# Patient Record
Sex: FEMALE | Race: WHITE
[De-identification: names, ages, dates, MRNs, and addresses within clinical notes are randomized per-mention and may not be internally consistent; named-entity substitution may affect disease eponyms.]

---

## 2018-05-02 ENCOUNTER — HOSPITAL ENCOUNTER (INPATIENT)
Dept: HOSPITAL 92 - ERS | Age: 73
LOS: 14 days | Discharge: HOME HEALTH | DRG: 853 | End: 2018-05-16
Attending: INTERNAL MEDICINE | Admitting: INTERNAL MEDICINE
Payer: MEDICARE

## 2018-05-02 ENCOUNTER — HOSPITAL ENCOUNTER (EMERGENCY)
Dept: HOSPITAL 9 - MADERS | Age: 73
End: 2018-05-02
Payer: MEDICARE

## 2018-05-02 VITALS — BODY MASS INDEX: 38.4 KG/M2

## 2018-05-02 DIAGNOSIS — L03.114: ICD-10-CM

## 2018-05-02 DIAGNOSIS — N17.9: ICD-10-CM

## 2018-05-02 DIAGNOSIS — M72.6: ICD-10-CM

## 2018-05-02 DIAGNOSIS — I10: ICD-10-CM

## 2018-05-02 DIAGNOSIS — Z88.0: ICD-10-CM

## 2018-05-02 DIAGNOSIS — E87.3: ICD-10-CM

## 2018-05-02 DIAGNOSIS — R65.21: ICD-10-CM

## 2018-05-02 DIAGNOSIS — A04.72: ICD-10-CM

## 2018-05-02 DIAGNOSIS — I42.9: ICD-10-CM

## 2018-05-02 DIAGNOSIS — E83.42: ICD-10-CM

## 2018-05-02 DIAGNOSIS — J96.01: ICD-10-CM

## 2018-05-02 DIAGNOSIS — E78.5: ICD-10-CM

## 2018-05-02 DIAGNOSIS — D70.9: ICD-10-CM

## 2018-05-02 DIAGNOSIS — I88.9: ICD-10-CM

## 2018-05-02 DIAGNOSIS — D50.0: ICD-10-CM

## 2018-05-02 DIAGNOSIS — Z79.899: ICD-10-CM

## 2018-05-02 DIAGNOSIS — A40.9: Primary | ICD-10-CM

## 2018-05-02 DIAGNOSIS — L02.512: ICD-10-CM

## 2018-05-02 DIAGNOSIS — Z85.3: ICD-10-CM

## 2018-05-02 DIAGNOSIS — E87.2: ICD-10-CM

## 2018-05-02 DIAGNOSIS — I11.0: ICD-10-CM

## 2018-05-02 DIAGNOSIS — A41.9: Primary | ICD-10-CM

## 2018-05-02 DIAGNOSIS — I48.0: ICD-10-CM

## 2018-05-02 DIAGNOSIS — Z93.1: ICD-10-CM

## 2018-05-02 DIAGNOSIS — E66.9: ICD-10-CM

## 2018-05-02 DIAGNOSIS — D69.6: ICD-10-CM

## 2018-05-02 DIAGNOSIS — E87.6: ICD-10-CM

## 2018-05-02 DIAGNOSIS — I47.1: ICD-10-CM

## 2018-05-02 DIAGNOSIS — Z79.01: ICD-10-CM

## 2018-05-02 DIAGNOSIS — Z88.5: ICD-10-CM

## 2018-05-02 DIAGNOSIS — I50.21: ICD-10-CM

## 2018-05-02 LAB
ALBUMIN SERPL BCG-MCNC: 3.1 G/DL (ref 3.4–4.8)
ALBUMIN SERPL BCG-MCNC: 3.4 G/DL (ref 3.4–4.8)
ALP SERPL-CCNC: 34 U/L (ref 40–150)
ALP SERPL-CCNC: 41 U/L (ref 40–150)
ALT SERPL W P-5'-P-CCNC: 38 U/L (ref 8–55)
ALT SERPL W P-5'-P-CCNC: 44 U/L (ref 8–55)
ANALYZER IN CARDIO: (no result)
ANION GAP SERPL CALC-SCNC: 19 MMOL/L (ref 10–20)
ANION GAP SERPL CALC-SCNC: 24 MMOL/L (ref 10–20)
APTT PPP: 32.5 SEC (ref 22.9–36.1)
APTT PPP: 33.4 SEC (ref 22.9–36.1)
AST SERPL-CCNC: 48 U/L (ref 5–34)
AST SERPL-CCNC: 49 U/L (ref 5–34)
BACTERIA UR QL AUTO: (no result) HPF
BASE EXCESS STD BLDA CALC-SCNC: -10.7 MEQ/L
BILIRUB SERPL-MCNC: 0.7 MG/DL (ref 0.2–1.2)
BILIRUB SERPL-MCNC: 0.8 MG/DL (ref 0.2–1.2)
BUN SERPL-MCNC: 28 MG/DL (ref 9.8–20.1)
BUN SERPL-MCNC: 29 MG/DL (ref 9.8–20.1)
CA-I BLDA-SCNC: 1 MMOL/L (ref 1.12–1.3)
CALCIUM SERPL-MCNC: 7 MG/DL (ref 7.8–10.44)
CALCIUM SERPL-MCNC: 8.1 MG/DL (ref 7.8–10.44)
CASTS #/AREA URNS LPF: (no result) LPF
CHLORIDE SERPL-SCNC: 109 MMOL/L (ref 98–107)
CHLORIDE SERPL-SCNC: 113 MMOL/L (ref 98–107)
CK MB SERPL-MCNC: 3.2 NG/ML (ref 0–6.6)
CK SERPL-CCNC: 299 U/L (ref 29–168)
CO2 SERPL-SCNC: 15 MMOL/L (ref 23–31)
CO2 SERPL-SCNC: 16 MMOL/L (ref 23–31)
CREAT CL PREDICTED SERPL C-G-VRATE: 0 ML/MIN (ref 70–130)
CREAT CL PREDICTED SERPL C-G-VRATE: 0 ML/MIN (ref 70–130)
CRYSTAL-AUWI FLAG: 0.1 (ref 0–15)
D DIMER PPP FEU-MCNC: 3.69 *MCG/ML (ref 0.27–0.43)
D DIMER PPP FEU-MCNC: 4.2 *MCG/ML (ref 0.27–0.43)
FIBRINOGEN PPP-MCNC: 310 MG/DL (ref 253–463)
FSP-QUALITATIVE: (no result)
FSP-QUALITATIVE: (no result)
FSP-SEMIQUANTITATIVE: (no result) MCG/ML (ref ?–5)
FSP-SEMIQUANTITATIVE: (no result) MCG/ML (ref ?–5)
GLOBULIN SER CALC-MCNC: 1.8 G/DL (ref 2.4–3.5)
GLOBULIN SER CALC-MCNC: 2.2 G/DL (ref 2.4–3.5)
GLUCOSE SERPL-MCNC: 121 MG/DL (ref 83–110)
GLUCOSE SERPL-MCNC: 122 MG/DL (ref 83–110)
HCO3 BLDA-SCNC: 13.7 MEQ/L (ref 22–26)
HCT VFR BLDA CALC: 36.8 % (ref 36–47)
HEV IGM SER QL: 47.3 (ref 0–7.99)
HGB BLD-MCNC: 12 G/DL (ref 12–16)
HGB BLD-MCNC: 12.7 G/DL (ref 12–16)
HGB BLD-MCNC: 13.6 G/DL (ref 12–16)
HGB BLDA-MCNC: 12.1 G/DL (ref 12–16)
HYALINE CASTS #/AREA URNS LPF: (no result) LPF
INR PPP: 1.6
INR PPP: 1.6
LIPASE SERPL-CCNC: 26 U/L (ref 8–78)
MCH RBC QN AUTO: 31.8 PG (ref 27–31)
MCH RBC QN AUTO: 33.5 PG (ref 27–31)
MCV RBC AUTO: 100 FL (ref 81–99)
MCV RBC AUTO: 95.6 FL (ref 81–99)
MDIFF COMPLETE?: YES
MDIFF COMPLETE?: YES
PATHC CAST-AUWI FLAG: 8.14 (ref 0–2.49)
PCO2 BLDA: 26.4 MMHG (ref 35–45)
PH BLDA: 7.33 [PH] (ref 7.35–7.45)
PLATELET # BLD AUTO: 150 THOU/UL (ref 130–400)
PLATELET # BLD AUTO: 166 THOU/UL (ref 130–400)
PLATELET # BLD AUTO: 179 THOU/UL (ref 130–400)
PLATELET BLD QL SMEAR: (no result)
PLATELET BLD QL SMEAR: (no result)
PO2 BLDA: 91.5 MMHG (ref 80–100)
POTASSIUM SERPL-SCNC: 3 MMOL/L (ref 3.5–5.1)
POTASSIUM SERPL-SCNC: 3.1 MMOL/L (ref 3.5–5.1)
PROTHROMBIN TIME: 19.1 SEC (ref 12–14.7)
PROTHROMBIN TIME: 19.4 SEC (ref 12–14.7)
RBC # BLD AUTO: 3.8 MILL/UL (ref 4.2–5.4)
RBC # BLD AUTO: 4.29 MILL/UL (ref 4.2–5.4)
RBC UR QL AUTO: (no result) HPF (ref 0–3)
REFLEX FOR REVIEW??: YES
SODIUM SERPL-SCNC: 144 MMOL/L (ref 136–145)
SODIUM SERPL-SCNC: 146 MMOL/L (ref 136–145)
SP GR UR STRIP: 1.03 (ref 1–1.04)
SPECIMEN DRAWN FROM PATIENT: (no result)
SPERM-AUWI FLAG: 0 (ref 0–9.9)
TROPONIN I SERPL DL<=0.01 NG/ML-MCNC: (no result) NG/ML (ref ?–0.03)
WBC # BLD AUTO: 2.2 THOU/UL (ref 4.8–10.8)
WBC # BLD AUTO: 4.3 THOU/UL (ref 4.8–10.8)
WBC UR QL AUTO: (no result) HPF (ref 0–3)
YEAST-AUWI FLAG: 0 (ref 0–25)

## 2018-05-02 PROCEDURE — 0LB40ZZ EXCISION OF LEFT UPPER ARM TENDON, OPEN APPROACH: ICD-10-PCS | Performed by: ORTHOPAEDIC SURGERY

## 2018-05-02 PROCEDURE — 83735 ASSAY OF MAGNESIUM: CPT

## 2018-05-02 PROCEDURE — 80053 COMPREHEN METABOLIC PANEL: CPT

## 2018-05-02 PROCEDURE — 0J9F0ZZ DRAINAGE OF LEFT UPPER ARM SUBCUTANEOUS TISSUE AND FASCIA, OPEN APPROACH: ICD-10-PCS | Performed by: ORTHOPAEDIC SURGERY

## 2018-05-02 PROCEDURE — 96365 THER/PROPH/DIAG IV INF INIT: CPT

## 2018-05-02 PROCEDURE — 87070 CULTURE OTHR SPECIMN AEROBIC: CPT

## 2018-05-02 PROCEDURE — 84484 ASSAY OF TROPONIN QUANT: CPT

## 2018-05-02 PROCEDURE — 86900 BLOOD TYPING SEROLOGIC ABO: CPT

## 2018-05-02 PROCEDURE — 94660 CPAP INITIATION&MGMT: CPT

## 2018-05-02 PROCEDURE — 96368 THER/DIAG CONCURRENT INF: CPT

## 2018-05-02 PROCEDURE — 87493 C DIFF AMPLIFIED PROBE: CPT

## 2018-05-02 PROCEDURE — 85060 BLOOD SMEAR INTERPRETATION: CPT

## 2018-05-02 PROCEDURE — 87077 CULTURE AEROBIC IDENTIFY: CPT

## 2018-05-02 PROCEDURE — 36415 COLL VENOUS BLD VENIPUNCTURE: CPT

## 2018-05-02 PROCEDURE — 96361 HYDRATE IV INFUSION ADD-ON: CPT

## 2018-05-02 PROCEDURE — 87206 SMEAR FLUORESCENT/ACID STAI: CPT

## 2018-05-02 PROCEDURE — 83690 ASSAY OF LIPASE: CPT

## 2018-05-02 PROCEDURE — 3E033XZ INTRODUCTION OF VASOPRESSOR INTO PERIPHERAL VEIN, PERCUTANEOUS APPROACH: ICD-10-PCS | Performed by: ORTHOPAEDIC SURGERY

## 2018-05-02 PROCEDURE — 88305 TISSUE EXAM BY PATHOLOGIST: CPT

## 2018-05-02 PROCEDURE — 0BH17EZ INSERTION OF ENDOTRACHEAL AIRWAY INTO TRACHEA, VIA NATURAL OR ARTIFICIAL OPENING: ICD-10-PCS | Performed by: ORTHOPAEDIC SURGERY

## 2018-05-02 PROCEDURE — 96374 THER/PROPH/DIAG INJ IV PUSH: CPT

## 2018-05-02 PROCEDURE — 87449 NOS EACH ORGANISM AG IA: CPT

## 2018-05-02 PROCEDURE — 87040 BLOOD CULTURE FOR BACTERIA: CPT

## 2018-05-02 PROCEDURE — C9113 INJ PANTOPRAZOLE SODIUM, VIA: HCPCS

## 2018-05-02 PROCEDURE — 94002 VENT MGMT INPAT INIT DAY: CPT

## 2018-05-02 PROCEDURE — 51702 INSERT TEMP BLADDER CATH: CPT

## 2018-05-02 PROCEDURE — C1758 CATHETER, URETERAL: HCPCS

## 2018-05-02 PROCEDURE — 71045 X-RAY EXAM CHEST 1 VIEW: CPT

## 2018-05-02 PROCEDURE — 85300 ANTITHROMBIN III ACTIVITY: CPT

## 2018-05-02 PROCEDURE — 87324 CLOSTRIDIUM AG IA: CPT

## 2018-05-02 PROCEDURE — 85384 FIBRINOGEN ACTIVITY: CPT

## 2018-05-02 PROCEDURE — 96366 THER/PROPH/DIAG IV INF ADDON: CPT

## 2018-05-02 PROCEDURE — 93010 ELECTROCARDIOGRAM REPORT: CPT

## 2018-05-02 PROCEDURE — 83880 ASSAY OF NATRIURETIC PEPTIDE: CPT

## 2018-05-02 PROCEDURE — C9363 INTEGRA MESHED BIL WOUND MAT: HCPCS

## 2018-05-02 PROCEDURE — 85379 FIBRIN DEGRADATION QUANT: CPT

## 2018-05-02 PROCEDURE — 88331 PATH CONSLTJ SURG 1 BLK 1SPC: CPT

## 2018-05-02 PROCEDURE — 87186 SC STD MICRODIL/AGAR DIL: CPT

## 2018-05-02 PROCEDURE — A4216 STERILE WATER/SALINE, 10 ML: HCPCS

## 2018-05-02 PROCEDURE — 96375 TX/PRO/DX INJ NEW DRUG ADDON: CPT

## 2018-05-02 PROCEDURE — 93005 ELECTROCARDIOGRAM TRACING: CPT

## 2018-05-02 PROCEDURE — 81003 URINALYSIS AUTO W/O SCOPE: CPT

## 2018-05-02 PROCEDURE — 85025 COMPLETE CBC W/AUTO DIFF WBC: CPT

## 2018-05-02 PROCEDURE — 87205 SMEAR GRAM STAIN: CPT

## 2018-05-02 PROCEDURE — 80202 ASSAY OF VANCOMYCIN: CPT

## 2018-05-02 PROCEDURE — 36556 INSERT NON-TUNNEL CV CATH: CPT

## 2018-05-02 PROCEDURE — 81015 MICROSCOPIC EXAM OF URINE: CPT

## 2018-05-02 PROCEDURE — 82553 CREATINE MB FRACTION: CPT

## 2018-05-02 PROCEDURE — P9047 ALBUMIN (HUMAN), 25%, 50ML: HCPCS

## 2018-05-02 PROCEDURE — 90715 TDAP VACCINE 7 YRS/> IM: CPT

## 2018-05-02 PROCEDURE — 94003 VENT MGMT INPAT SUBQ DAY: CPT

## 2018-05-02 PROCEDURE — 87102 FUNGUS ISOLATION CULTURE: CPT

## 2018-05-02 PROCEDURE — 85362 FIBRIN DEGRADATION PRODUCTS: CPT

## 2018-05-02 PROCEDURE — 96376 TX/PRO/DX INJ SAME DRUG ADON: CPT

## 2018-05-02 PROCEDURE — 93306 TTE W/DOPPLER COMPLETE: CPT

## 2018-05-02 PROCEDURE — 85610 PROTHROMBIN TIME: CPT

## 2018-05-02 PROCEDURE — 86850 RBC ANTIBODY SCREEN: CPT

## 2018-05-02 PROCEDURE — 85049 AUTOMATED PLATELET COUNT: CPT

## 2018-05-02 PROCEDURE — 74018 RADEX ABDOMEN 1 VIEW: CPT

## 2018-05-02 PROCEDURE — 36416 COLLJ CAPILLARY BLOOD SPEC: CPT

## 2018-05-02 PROCEDURE — 84100 ASSAY OF PHOSPHORUS: CPT

## 2018-05-02 PROCEDURE — 86901 BLOOD TYPING SEROLOGIC RH(D): CPT

## 2018-05-02 PROCEDURE — 5A1955Z RESPIRATORY VENTILATION, GREATER THAN 96 CONSECUTIVE HOURS: ICD-10-PCS | Performed by: ORTHOPAEDIC SURGERY

## 2018-05-02 PROCEDURE — 82805 BLOOD GASES W/O2 SATURATION: CPT

## 2018-05-02 PROCEDURE — 85730 THROMBOPLASTIN TIME PARTIAL: CPT

## 2018-05-02 PROCEDURE — 90471 IMMUNIZATION ADMIN: CPT

## 2018-05-02 PROCEDURE — 96367 TX/PROPH/DG ADDL SEQ IV INF: CPT

## 2018-05-02 PROCEDURE — 94640 AIRWAY INHALATION TREATMENT: CPT

## 2018-05-02 PROCEDURE — 88304 TISSUE EXAM BY PATHOLOGIST: CPT

## 2018-05-02 PROCEDURE — 83605 ASSAY OF LACTIC ACID: CPT

## 2018-05-02 PROCEDURE — S0020 INJECTION, BUPIVICAINE HYDRO: HCPCS

## 2018-05-02 RX ADMIN — NOREPINEPHRINE BITARTRATE SCH MLS: 1 INJECTION INTRAVENOUS at 22:27

## 2018-05-02 RX ADMIN — Medication SCH ML: at 20:50

## 2018-05-02 RX ADMIN — Medication SCH ML: at 23:35

## 2018-05-02 RX ADMIN — FENTANYL CITRATE SCH MLS: 50 INJECTION, SOLUTION INTRAMUSCULAR; INTRAVENOUS at 16:20

## 2018-05-02 RX ADMIN — CLINDAMYCIN PHOSPHATE SCH MLS: 900 INJECTION, SOLUTION INTRAVENOUS at 18:38

## 2018-05-02 RX ADMIN — CLINDAMYCIN PHOSPHATE SCH MLS: 900 INJECTION, SOLUTION INTRAVENOUS at 23:36

## 2018-05-02 RX ADMIN — VASOPRESSIN SCH MLS: 20 INJECTION INTRAVENOUS at 19:39

## 2018-05-02 RX ADMIN — CEFEPIME HYDROCHLORIDE SCH MLS: 1 INJECTION, POWDER, FOR SOLUTION INTRAMUSCULAR; INTRAVENOUS at 20:49

## 2018-05-02 NOTE — RAD
LEFT HAND 3 VIEWS:

 

HISTORY: 

Injury, left hand pain, cut left hand.

 

FINDINGS/IMPRESSION:  

No acute fracture or dislocation is identified.  No radiopaque foreign body is seen.  Degenerative ch
anges are present, most prominent in the 1st carpometacarpal joint.

 

POS: [facility]

## 2018-05-03 LAB
ALBUMIN SERPL BCG-MCNC: 2.7 G/DL (ref 3.4–4.8)
ALP SERPL-CCNC: 17 U/L (ref 40–150)
ALT SERPL W P-5'-P-CCNC: 42 U/L (ref 8–55)
ANALYZER IN CARDIO: (no result)
ANION GAP SERPL CALC-SCNC: 23 MMOL/L (ref 10–20)
AST SERPL-CCNC: 62 U/L (ref 5–34)
BASE EXCESS STD BLDA CALC-SCNC: -15.8 MEQ/L
BILIRUB SERPL-MCNC: 0.8 MG/DL (ref 0.2–1.2)
BUN SERPL-MCNC: 36 MG/DL (ref 9.8–20.1)
CA-I BLDA-SCNC: 1 MMOL/L (ref 1.12–1.3)
CALCIUM SERPL-MCNC: 7.1 MG/DL (ref 7.8–10.44)
CHLORIDE SERPL-SCNC: 110 MMOL/L (ref 98–107)
CO2 SERPL-SCNC: 12 MMOL/L (ref 23–31)
CREAT CL PREDICTED SERPL C-G-VRATE: 41 ML/MIN (ref 70–130)
GLOBULIN SER CALC-MCNC: 2.2 G/DL (ref 2.4–3.5)
GLUCOSE SERPL-MCNC: 125 MG/DL (ref 83–110)
HCO3 BLDA-SCNC: 11 MEQ/L (ref 22–26)
HCT VFR BLDA CALC: 36.1 % (ref 36–47)
HGB BLD-MCNC: 11.2 G/DL (ref 12–16)
HGB BLD-MCNC: 12.3 G/DL (ref 12–16)
HGB BLDA-MCNC: 11.9 G/DL (ref 12–16)
MACROCYTES BLD QL SMEAR: (no result) (100X)
MAGNESIUM SERPL-MCNC: 1.2 MG/DL (ref 1.6–2.6)
MCH RBC QN AUTO: 33.4 PG (ref 27–31)
MCH RBC QN AUTO: 34.4 PG (ref 27–31)
MCV RBC AUTO: 101 FL (ref 81–99)
MCV RBC AUTO: 102 FL (ref 81–99)
MDIFF COMPLETE?: YES
MDIFF COMPLETE?: YES
O2 A-A PPRESDIFF RESPIRATORY: 135.5 MM[HG] (ref 0–20)
PCO2 BLDA: 29.6 MMHG (ref 35–45)
PH BLDA: 7.19 [PH] (ref 7.35–7.45)
PLATELET # BLD AUTO: 128 THOU/UL (ref 130–400)
PLATELET # BLD AUTO: 90 THOU/UL (ref 130–400)
PLATELET BLD QL SMEAR: (no result)
PLATELET BLD QL SMEAR: (no result)
PO2 BLDA: 112.7 MMHG (ref 80–100)
POTASSIUM SERPL-SCNC: 4.2 MMOL/L (ref 3.5–5.1)
RBC # BLD AUTO: 3.36 MILL/UL (ref 4.2–5.4)
RBC # BLD AUTO: 3.59 MILL/UL (ref 4.2–5.4)
SODIUM SERPL-SCNC: 141 MMOL/L (ref 136–145)
SPECIMEN DRAWN FROM PATIENT: (no result)
WBC # BLD AUTO: 8.8 THOU/UL (ref 4.8–10.8)
WBC # BLD AUTO: 9.1 THOU/UL (ref 4.8–10.8)

## 2018-05-03 PROCEDURE — 0JBH0ZZ EXCISION OF LEFT LOWER ARM SUBCUTANEOUS TISSUE AND FASCIA, OPEN APPROACH: ICD-10-PCS | Performed by: ORTHOPAEDIC SURGERY

## 2018-05-03 RX ADMIN — CLINDAMYCIN PHOSPHATE SCH MLS: 900 INJECTION, SOLUTION INTRAVENOUS at 18:05

## 2018-05-03 RX ADMIN — CEFEPIME HYDROCHLORIDE SCH MLS: 1 INJECTION, POWDER, FOR SOLUTION INTRAMUSCULAR; INTRAVENOUS at 04:00

## 2018-05-03 RX ADMIN — SODIUM BICARBONATE SCH MLS: 84 INJECTION, SOLUTION INTRAVENOUS at 09:24

## 2018-05-03 RX ADMIN — FENTANYL CITRATE SCH MLS: 50 INJECTION, SOLUTION INTRAMUSCULAR; INTRAVENOUS at 03:17

## 2018-05-03 RX ADMIN — VASOPRESSIN SCH MLS: 20 INJECTION INTRAVENOUS at 20:09

## 2018-05-03 RX ADMIN — Medication SCH: at 09:26

## 2018-05-03 RX ADMIN — Medication SCH GM: at 18:05

## 2018-05-03 RX ADMIN — Medication SCH ML: at 05:45

## 2018-05-03 RX ADMIN — CLINDAMYCIN PHOSPHATE SCH MLS: 900 INJECTION, SOLUTION INTRAVENOUS at 11:35

## 2018-05-03 RX ADMIN — CLINDAMYCIN PHOSPHATE SCH MLS: 900 INJECTION, SOLUTION INTRAVENOUS at 05:45

## 2018-05-03 RX ADMIN — Medication SCH: at 11:48

## 2018-05-03 RX ADMIN — CEFEPIME HYDROCHLORIDE SCH MLS: 1 INJECTION, POWDER, FOR SOLUTION INTRAMUSCULAR; INTRAVENOUS at 11:35

## 2018-05-03 RX ADMIN — Medication SCH ML: at 20:09

## 2018-05-03 RX ADMIN — Medication PRN ML: at 09:26

## 2018-05-03 RX ADMIN — Medication SCH ML: at 18:09

## 2018-05-03 RX ADMIN — NOREPINEPHRINE BITARTRATE SCH MLS: 1 INJECTION INTRAVENOUS at 06:01

## 2018-05-04 LAB
ALBUMIN SERPL BCG-MCNC: 2.2 G/DL (ref 3.4–4.8)
ALP SERPL-CCNC: 25 U/L (ref 40–150)
ALT SERPL W P-5'-P-CCNC: 46 U/L (ref 8–55)
ANALYZER IN CARDIO: (no result)
ANION GAP SERPL CALC-SCNC: 20 MMOL/L (ref 10–20)
AST SERPL-CCNC: 108 U/L (ref 5–34)
BASE EXCESS STD BLDA CALC-SCNC: -8.3 MEQ/L
BILIRUB SERPL-MCNC: 1.1 MG/DL (ref 0.2–1.2)
BUN SERPL-MCNC: 52 MG/DL (ref 9.8–20.1)
CA-I BLDA-SCNC: 1 MMOL/L (ref 1.12–1.3)
CALCIUM SERPL-MCNC: 6.6 MG/DL (ref 7.8–10.44)
CHLORIDE SERPL-SCNC: 110 MMOL/L (ref 98–107)
CO2 SERPL-SCNC: 16 MMOL/L (ref 23–31)
CREAT CL PREDICTED SERPL C-G-VRATE: 40 ML/MIN (ref 70–130)
D DIMER PPP FEU-MCNC: 4.88 *MCG/ML (ref 0.27–0.43)
FSP-QUALITATIVE: (no result)
FSP-SEMIQUANTITATIVE: (no result) MCG/ML (ref ?–5)
GLOBULIN SER CALC-MCNC: 1.8 G/DL (ref 2.4–3.5)
GLUCOSE SERPL-MCNC: 126 MG/DL (ref 83–110)
HCO3 BLDA-SCNC: 14.7 MEQ/L (ref 22–26)
HCT VFR BLDA CALC: 27.3 % (ref 36–47)
HGB BLD-MCNC: 10.4 G/DL (ref 12–16)
HGB BLDA-MCNC: 9.9 G/DL (ref 12–16)
MACROCYTES BLD QL SMEAR: (no result) (100X)
MAGNESIUM SERPL-MCNC: 1.7 MG/DL (ref 1.6–2.6)
MCH RBC QN AUTO: 33.5 PG (ref 27–31)
MCV RBC AUTO: 100 FL (ref 81–99)
MDIFF COMPLETE?: YES
PCO2 BLDA: 23 MMHG (ref 35–45)
PH BLDA: 7.42 [PH] (ref 7.35–7.45)
PLATELET # BLD AUTO: 78 THOU/UL (ref 130–400)
PLATELET BLD QL SMEAR: (no result)
PO2 BLDA: 103.8 MMHG (ref 80–100)
POTASSIUM SERPL-SCNC: 3.5 MMOL/L (ref 3.5–5.1)
RBC # BLD AUTO: 3.1 MILL/UL (ref 4.2–5.4)
SODIUM SERPL-SCNC: 142 MMOL/L (ref 136–145)
SPECIMEN DRAWN FROM PATIENT: (no result)
VANCOMYCIN TROUGH SERPL-MCNC: 5.5 UG/ML
WBC # BLD AUTO: 8.2 THOU/UL (ref 4.8–10.8)

## 2018-05-04 RX ADMIN — Medication SCH: at 06:15

## 2018-05-04 RX ADMIN — CLINDAMYCIN PHOSPHATE SCH MLS: 900 INJECTION, SOLUTION INTRAVENOUS at 00:51

## 2018-05-04 RX ADMIN — INSULIN HUMAN PRN UNIT: 100 INJECTION, SOLUTION PARENTERAL at 18:45

## 2018-05-04 RX ADMIN — SODIUM BICARBONATE SCH MLS: 84 INJECTION, SOLUTION INTRAVENOUS at 05:55

## 2018-05-04 RX ADMIN — Medication SCH: at 03:55

## 2018-05-04 RX ADMIN — HYDROCORTISONE SODIUM SUCCINATE SCH MG: 100 INJECTION, POWDER, FOR SOLUTION INTRAMUSCULAR; INTRAVENOUS at 13:33

## 2018-05-04 RX ADMIN — Medication SCH ML: at 10:25

## 2018-05-04 RX ADMIN — CLINDAMYCIN PHOSPHATE SCH MLS: 900 INJECTION, SOLUTION INTRAVENOUS at 13:33

## 2018-05-04 RX ADMIN — Medication SCH GM: at 05:49

## 2018-05-04 RX ADMIN — Medication SCH: at 18:06

## 2018-05-04 RX ADMIN — Medication SCH GM: at 18:37

## 2018-05-04 RX ADMIN — HYDROCORTISONE SODIUM SUCCINATE SCH MG: 100 INJECTION, POWDER, FOR SOLUTION INTRAMUSCULAR; INTRAVENOUS at 18:12

## 2018-05-04 RX ADMIN — INSULIN HUMAN PRN UNIT: 100 INJECTION, SOLUTION PARENTERAL at 20:58

## 2018-05-04 RX ADMIN — VASOPRESSIN SCH MLS: 20 INJECTION INTRAVENOUS at 13:55

## 2018-05-04 RX ADMIN — Medication SCH ML: at 20:26

## 2018-05-04 RX ADMIN — CLINDAMYCIN PHOSPHATE SCH MLS: 900 INJECTION, SOLUTION INTRAVENOUS at 18:12

## 2018-05-04 RX ADMIN — AMIODARONE HYDROCHLORIDE SCH MLS: 50 INJECTION, SOLUTION INTRAVENOUS at 18:37

## 2018-05-04 RX ADMIN — FENTANYL CITRATE SCH MLS: 50 INJECTION, SOLUTION INTRAMUSCULAR; INTRAVENOUS at 12:39

## 2018-05-04 RX ADMIN — CLINDAMYCIN PHOSPHATE SCH MLS: 900 INJECTION, SOLUTION INTRAVENOUS at 05:49

## 2018-05-04 RX ADMIN — SODIUM BICARBONATE SCH: 84 INJECTION, SOLUTION INTRAVENOUS at 03:54

## 2018-05-04 RX ADMIN — POTASSIUM CHLORIDE PRN MLS: 29.8 INJECTION, SOLUTION INTRAVENOUS at 06:38

## 2018-05-04 RX ADMIN — SODIUM BICARBONATE SCH MLS: 84 INJECTION, SOLUTION INTRAVENOUS at 20:26

## 2018-05-04 RX ADMIN — INSULIN HUMAN PRN UNIT: 100 INJECTION, SOLUTION PARENTERAL at 13:47

## 2018-05-04 RX ADMIN — NOREPINEPHRINE BITARTRATE SCH MLS: 1 INJECTION INTRAVENOUS at 09:28

## 2018-05-05 LAB
ALBUMIN SERPL BCG-MCNC: 2.1 G/DL (ref 3.4–4.8)
ALP SERPL-CCNC: 48 U/L (ref 40–150)
ALT SERPL W P-5'-P-CCNC: 43 U/L (ref 8–55)
ANALYZER IN CARDIO: (no result)
ANALYZER IN CARDIO: (no result)
ANION GAP SERPL CALC-SCNC: 12 MMOL/L (ref 10–20)
AST SERPL-CCNC: 105 U/L (ref 5–34)
BASE EXCESS STD BLDA CALC-SCNC: -0.3 MEQ/L
BASE EXCESS STD BLDA CALC-SCNC: -0.5 MEQ/L
BILIRUB SERPL-MCNC: 1.7 MG/DL (ref 0.2–1.2)
BUN SERPL-MCNC: 47 MG/DL (ref 9.8–20.1)
CA-I BLDA-SCNC: 1 MMOL/L (ref 1.12–1.3)
CA-I BLDA-SCNC: 1.1 MMOL/L (ref 1.12–1.3)
CALCIUM SERPL-MCNC: 6.9 MG/DL (ref 7.8–10.44)
CHLORIDE SERPL-SCNC: 107 MMOL/L (ref 98–107)
CO2 SERPL-SCNC: 25 MMOL/L (ref 23–31)
CREAT CL PREDICTED SERPL C-G-VRATE: 77 ML/MIN (ref 70–130)
GLOBULIN SER CALC-MCNC: 2.1 G/DL (ref 2.4–3.5)
GLUCOSE SERPL-MCNC: 205 MG/DL (ref 83–110)
HCO3 BLDA-SCNC: 20.8 MEQ/L (ref 22–26)
HCO3 BLDA-SCNC: 24.1 MEQ/L (ref 22–26)
HCT VFR BLDA CALC: 26.9 % (ref 36–47)
HCT VFR BLDA CALC: 42.6 % (ref 36–47)
HGB BLD-MCNC: 9.9 G/DL (ref 12–16)
HGB BLDA-MCNC: 10.9 G/DL (ref 12–16)
HGB BLDA-MCNC: 9.7 G/DL (ref 12–16)
MAGNESIUM SERPL-MCNC: 2.4 MG/DL (ref 1.6–2.6)
MCH RBC QN AUTO: 33.4 PG (ref 27–31)
MCV RBC AUTO: 97 FL (ref 81–99)
MDIFF COMPLETE?: YES
O2 A-A PPRESDIFF RESPIRATORY: 77.27 MM[HG] (ref 0–20)
PCO2 BLDA: 23.8 MMHG (ref 35–45)
PCO2 BLDA: 38.7 MMHG (ref 35–45)
PH BLDA: 7.41 [PH] (ref 7.35–7.45)
PH BLDA: 7.56 [PH] (ref 7.35–7.45)
PLATELET # BLD AUTO: 69 THOU/UL (ref 130–400)
PLATELET BLD QL SMEAR: (no result)
PO2 BLDA: 134 MMHG (ref 80–100)
PO2 BLDA: 74 MMHG (ref 80–100)
POTASSIUM SERPL-SCNC: 2.7 MMOL/L (ref 3.5–5.1)
POTASSIUM SERPL-SCNC: 2.8 MMOL/L (ref 3.5–5.1)
RBC # BLD AUTO: 2.96 MILL/UL (ref 4.2–5.4)
SODIUM SERPL-SCNC: 141 MMOL/L (ref 136–145)
SPECIMEN DRAWN FROM PATIENT: (no result)
SPECIMEN DRAWN FROM PATIENT: (no result)
WBC # BLD AUTO: 11 THOU/UL (ref 4.8–10.8)

## 2018-05-05 PROCEDURE — 0LB60ZZ EXCISION OF LEFT LOWER ARM AND WRIST TENDON, OPEN APPROACH: ICD-10-PCS | Performed by: ORTHOPAEDIC SURGERY

## 2018-05-05 RX ADMIN — POTASSIUM CHLORIDE PRN MLS: 29.8 INJECTION, SOLUTION INTRAVENOUS at 06:02

## 2018-05-05 RX ADMIN — INSULIN HUMAN PRN UNIT: 100 INJECTION, SOLUTION PARENTERAL at 19:38

## 2018-05-05 RX ADMIN — INSULIN HUMAN PRN UNIT: 100 INJECTION, SOLUTION PARENTERAL at 05:31

## 2018-05-05 RX ADMIN — Medication SCH GM: at 05:21

## 2018-05-05 RX ADMIN — Medication SCH: at 19:48

## 2018-05-05 RX ADMIN — FENTANYL CITRATE SCH MLS: 50 INJECTION, SOLUTION INTRAMUSCULAR; INTRAVENOUS at 01:20

## 2018-05-05 RX ADMIN — FENTANYL CITRATE SCH MLS: 50 INJECTION, SOLUTION INTRAMUSCULAR; INTRAVENOUS at 23:56

## 2018-05-05 RX ADMIN — SODIUM BICARBONATE SCH MLS: 84 INJECTION, SOLUTION INTRAVENOUS at 19:35

## 2018-05-05 RX ADMIN — Medication PRN ML: at 05:24

## 2018-05-05 RX ADMIN — POTASSIUM CHLORIDE SCH MLS: 14.9 INJECTION, SOLUTION INTRAVENOUS at 15:20

## 2018-05-05 RX ADMIN — CLINDAMYCIN PHOSPHATE SCH MLS: 900 INJECTION, SOLUTION INTRAVENOUS at 00:45

## 2018-05-05 RX ADMIN — NOREPINEPHRINE BITARTRATE SCH MLS: 1 INJECTION INTRAVENOUS at 20:21

## 2018-05-05 RX ADMIN — CLINDAMYCIN PHOSPHATE SCH MLS: 900 INJECTION, SOLUTION INTRAVENOUS at 05:21

## 2018-05-05 RX ADMIN — HYDROCORTISONE SODIUM SUCCINATE SCH MG: 100 INJECTION, POWDER, FOR SOLUTION INTRAMUSCULAR; INTRAVENOUS at 00:46

## 2018-05-05 RX ADMIN — CLINDAMYCIN PHOSPHATE SCH MLS: 900 INJECTION, SOLUTION INTRAVENOUS at 23:59

## 2018-05-05 RX ADMIN — Medication SCH GM: at 19:00

## 2018-05-05 RX ADMIN — HYDROCORTISONE SODIUM SUCCINATE SCH MG: 100 INJECTION, POWDER, FOR SOLUTION INTRAMUSCULAR; INTRAVENOUS at 11:47

## 2018-05-05 RX ADMIN — HYDROCORTISONE SODIUM SUCCINATE SCH MG: 100 INJECTION, POWDER, FOR SOLUTION INTRAMUSCULAR; INTRAVENOUS at 05:22

## 2018-05-05 RX ADMIN — POTASSIUM CHLORIDE SCH MLS: 14.9 INJECTION, SOLUTION INTRAVENOUS at 15:30

## 2018-05-05 RX ADMIN — Medication SCH: at 08:54

## 2018-05-05 RX ADMIN — Medication SCH ML: at 11:50

## 2018-05-05 RX ADMIN — POTASSIUM CHLORIDE SCH MLS: 14.9 INJECTION, SOLUTION INTRAVENOUS at 13:27

## 2018-05-05 RX ADMIN — POTASSIUM CHLORIDE SCH MLS: 14.9 INJECTION, SOLUTION INTRAVENOUS at 19:36

## 2018-05-05 RX ADMIN — HYDROCORTISONE SODIUM SUCCINATE SCH MG: 100 INJECTION, POWDER, FOR SOLUTION INTRAMUSCULAR; INTRAVENOUS at 19:45

## 2018-05-05 RX ADMIN — Medication SCH: at 08:55

## 2018-05-05 RX ADMIN — FENTANYL CITRATE SCH MLS: 50 INJECTION, SOLUTION INTRAMUSCULAR; INTRAVENOUS at 11:14

## 2018-05-05 RX ADMIN — Medication SCH ML: at 19:00

## 2018-05-05 RX ADMIN — VASOPRESSIN SCH MLS: 20 INJECTION INTRAVENOUS at 05:17

## 2018-05-05 RX ADMIN — CLINDAMYCIN PHOSPHATE SCH MLS: 900 INJECTION, SOLUTION INTRAVENOUS at 19:43

## 2018-05-05 RX ADMIN — INSULIN HUMAN PRN UNIT: 100 INJECTION, SOLUTION PARENTERAL at 13:20

## 2018-05-05 RX ADMIN — CLINDAMYCIN PHOSPHATE SCH MLS: 900 INJECTION, SOLUTION INTRAVENOUS at 11:49

## 2018-05-05 RX ADMIN — Medication SCH: at 00:51

## 2018-05-05 RX ADMIN — SODIUM BICARBONATE SCH MLS: 84 INJECTION, SOLUTION INTRAVENOUS at 05:57

## 2018-05-06 LAB
ALBUMIN SERPL BCG-MCNC: 2.3 G/DL (ref 3.4–4.8)
ALP SERPL-CCNC: 71 U/L (ref 40–150)
ALT SERPL W P-5'-P-CCNC: 54 U/L (ref 8–55)
ANALYZER IN CARDIO: (no result)
ANION GAP SERPL CALC-SCNC: 6 MMOL/L (ref 10–20)
ANION GAP SERPL CALC-SCNC: 9 MMOL/L (ref 10–20)
AST SERPL-CCNC: 135 U/L (ref 5–34)
BASE EXCESS STD BLDA CALC-SCNC: 7.1 MEQ/L
BILIRUB SERPL-MCNC: 1.8 MG/DL (ref 0.2–1.2)
BUN SERPL-MCNC: 46 MG/DL (ref 9.8–20.1)
BUN SERPL-MCNC: 47 MG/DL (ref 9.8–20.1)
CALCIUM SERPL-MCNC: 7.1 MG/DL (ref 7.8–10.44)
CALCIUM SERPL-MCNC: 7.1 MG/DL (ref 7.8–10.44)
CHLORIDE SERPL-SCNC: 100 MMOL/L (ref 98–107)
CHLORIDE SERPL-SCNC: 102 MMOL/L (ref 98–107)
CO2 SERPL-SCNC: 34 MMOL/L (ref 23–31)
CO2 SERPL-SCNC: 35 MMOL/L (ref 23–31)
CREAT CL PREDICTED SERPL C-G-VRATE: 80 ML/MIN (ref 70–130)
CREAT CL PREDICTED SERPL C-G-VRATE: 86 ML/MIN (ref 70–130)
GLOBULIN SER CALC-MCNC: 2.2 G/DL (ref 2.4–3.5)
GLUCOSE SERPL-MCNC: 148 MG/DL (ref 83–110)
GLUCOSE SERPL-MCNC: 167 MG/DL (ref 83–110)
HCO3 BLDA-SCNC: 31.5 MEQ/L (ref 22–26)
HCT VFR BLDA CALC: 29.1 % (ref 36–47)
HGB BLD-MCNC: 10 G/DL (ref 12–16)
HGB BLD-MCNC: 10.5 G/DL (ref 12–16)
HGB BLDA-MCNC: 9.9 G/DL (ref 12–16)
MAGNESIUM SERPL-MCNC: 2 MG/DL (ref 1.6–2.6)
MCH RBC QN AUTO: 33.1 PG (ref 27–31)
MCH RBC QN AUTO: 33.3 PG (ref 27–31)
MCV RBC AUTO: 97.6 FL (ref 81–99)
MCV RBC AUTO: 97.9 FL (ref 81–99)
MDIFF COMPLETE?: YES
MDIFF COMPLETE?: YES
O2 A-A PPRESDIFF RESPIRATORY: 105.38 MM[HG] (ref 0–20)
PCO2 BLDA: 44.1 MMHG (ref 35–45)
PH BLDA: 7.47 [PH] (ref 7.35–7.45)
PLATELET # BLD AUTO: 64 THOU/UL (ref 130–400)
PLATELET # BLD AUTO: 68 THOU/UL (ref 130–400)
PLATELET BLD QL SMEAR: (no result)
PLATELET BLD QL SMEAR: (no result)
PO2 BLDA: 124.7 MMHG (ref 80–100)
POTASSIUM SERPL-SCNC: 3.6 MMOL/L (ref 3.5–5.1)
POTASSIUM SERPL-SCNC: 3.7 MMOL/L (ref 3.5–5.1)
RBC # BLD AUTO: 2.99 MILL/UL (ref 4.2–5.4)
RBC # BLD AUTO: 3.18 MILL/UL (ref 4.2–5.4)
SODIUM SERPL-SCNC: 138 MMOL/L (ref 136–145)
SODIUM SERPL-SCNC: 140 MMOL/L (ref 136–145)
SPECIMEN DRAWN FROM PATIENT: (no result)
TOXIC GRANULES BLD QL SMEAR: SLIGHT
WBC # BLD AUTO: 16.9 THOU/UL (ref 4.8–10.8)
WBC # BLD AUTO: 17.9 THOU/UL (ref 4.8–10.8)

## 2018-05-06 PROCEDURE — 0JBK0ZZ EXCISION OF LEFT HAND SUBCUTANEOUS TISSUE AND FASCIA, OPEN APPROACH: ICD-10-PCS | Performed by: ORTHOPAEDIC SURGERY

## 2018-05-06 RX ADMIN — HYDROCORTISONE SODIUM SUCCINATE SCH MG: 100 INJECTION, POWDER, FOR SOLUTION INTRAMUSCULAR; INTRAVENOUS at 06:10

## 2018-05-06 RX ADMIN — SODIUM BICARBONATE SCH MLS: 84 INJECTION, SOLUTION INTRAVENOUS at 04:30

## 2018-05-06 RX ADMIN — SODIUM BICARBONATE SCH: 84 INJECTION, SOLUTION INTRAVENOUS at 19:41

## 2018-05-06 RX ADMIN — HYDROCORTISONE SODIUM SUCCINATE SCH MG: 100 INJECTION, POWDER, FOR SOLUTION INTRAMUSCULAR; INTRAVENOUS at 12:10

## 2018-05-06 RX ADMIN — Medication SCH ML: at 09:00

## 2018-05-06 RX ADMIN — CLINDAMYCIN PHOSPHATE SCH MLS: 900 INJECTION, SOLUTION INTRAVENOUS at 06:10

## 2018-05-06 RX ADMIN — CLINDAMYCIN PHOSPHATE SCH MLS: 900 INJECTION, SOLUTION INTRAVENOUS at 19:37

## 2018-05-06 RX ADMIN — HYDROCORTISONE SODIUM SUCCINATE SCH MG: 100 INJECTION, POWDER, FOR SOLUTION INTRAMUSCULAR; INTRAVENOUS at 06:16

## 2018-05-06 RX ADMIN — Medication SCH ML: at 12:13

## 2018-05-06 RX ADMIN — Medication SCH GM: at 06:12

## 2018-05-06 RX ADMIN — Medication SCH GM: at 19:53

## 2018-05-06 RX ADMIN — Medication SCH ML: at 21:11

## 2018-05-06 RX ADMIN — Medication SCH ML: at 19:43

## 2018-05-06 RX ADMIN — Medication SCH ML: at 06:16

## 2018-05-06 RX ADMIN — INSULIN HUMAN PRN UNIT: 100 INJECTION, SOLUTION PARENTERAL at 06:19

## 2018-05-06 RX ADMIN — SODIUM BICARBONATE SCH MLS: 84 INJECTION, SOLUTION INTRAVENOUS at 19:41

## 2018-05-06 RX ADMIN — AMIODARONE HYDROCHLORIDE SCH MLS: 50 INJECTION, SOLUTION INTRAVENOUS at 18:25

## 2018-05-06 RX ADMIN — HYDROCORTISONE SODIUM SUCCINATE SCH MG: 100 INJECTION, POWDER, FOR SOLUTION INTRAMUSCULAR; INTRAVENOUS at 19:42

## 2018-05-06 RX ADMIN — Medication SCH ML: at 00:00

## 2018-05-06 RX ADMIN — CLINDAMYCIN PHOSPHATE SCH MLS: 900 INJECTION, SOLUTION INTRAVENOUS at 12:09

## 2018-05-07 LAB
ALBUMIN SERPL BCG-MCNC: 2.3 G/DL (ref 3.4–4.8)
ALP SERPL-CCNC: 87 U/L (ref 40–150)
ALT SERPL W P-5'-P-CCNC: 50 U/L (ref 8–55)
ANION GAP SERPL CALC-SCNC: 14 MMOL/L (ref 10–20)
AST SERPL-CCNC: 110 U/L (ref 5–34)
BILIRUB SERPL-MCNC: 1.3 MG/DL (ref 0.2–1.2)
BUN SERPL-MCNC: 38 MG/DL (ref 9.8–20.1)
CALCIUM SERPL-MCNC: 7.2 MG/DL (ref 7.8–10.44)
CHLORIDE SERPL-SCNC: 97 MMOL/L (ref 98–107)
CO2 SERPL-SCNC: 34 MMOL/L (ref 23–31)
CREAT CL PREDICTED SERPL C-G-VRATE: 95 ML/MIN (ref 70–130)
GLOBULIN SER CALC-MCNC: 2.2 G/DL (ref 2.4–3.5)
GLUCOSE SERPL-MCNC: 210 MG/DL (ref 83–110)
HGB BLD-MCNC: 9.9 G/DL (ref 12–16)
MAGNESIUM SERPL-MCNC: 2 MG/DL (ref 1.6–2.6)
MCH RBC QN AUTO: 33.7 PG (ref 27–31)
MCV RBC AUTO: 98 FL (ref 81–99)
MDIFF COMPLETE?: YES
PLATELET # BLD AUTO: 70 THOU/UL (ref 130–400)
PLATELET BLD QL SMEAR: (no result)
POTASSIUM SERPL-SCNC: 3.4 MMOL/L (ref 3.5–5.1)
POTASSIUM SERPL-SCNC: 3.8 MMOL/L (ref 3.5–5.1)
RBC # BLD AUTO: 2.94 MILL/UL (ref 4.2–5.4)
SODIUM SERPL-SCNC: 141 MMOL/L (ref 136–145)
WBC # BLD AUTO: 17.2 THOU/UL (ref 4.8–10.8)

## 2018-05-07 RX ADMIN — HYDROCORTISONE SODIUM SUCCINATE SCH MG: 100 INJECTION, POWDER, FOR SOLUTION INTRAMUSCULAR; INTRAVENOUS at 18:09

## 2018-05-07 RX ADMIN — INSULIN HUMAN PRN UNIT: 100 INJECTION, SOLUTION PARENTERAL at 20:36

## 2018-05-07 RX ADMIN — CLINDAMYCIN PHOSPHATE SCH MLS: 900 INJECTION, SOLUTION INTRAVENOUS at 18:08

## 2018-05-07 RX ADMIN — Medication SCH ML: at 08:49

## 2018-05-07 RX ADMIN — CLINDAMYCIN PHOSPHATE SCH MLS: 900 INJECTION, SOLUTION INTRAVENOUS at 11:56

## 2018-05-07 RX ADMIN — Medication SCH: at 23:10

## 2018-05-07 RX ADMIN — Medication SCH: at 17:56

## 2018-05-07 RX ADMIN — Medication SCH ML: at 06:21

## 2018-05-07 RX ADMIN — CLINDAMYCIN PHOSPHATE SCH MLS: 900 INJECTION, SOLUTION INTRAVENOUS at 05:41

## 2018-05-07 RX ADMIN — SODIUM BICARBONATE SCH MLS: 84 INJECTION, SOLUTION INTRAVENOUS at 03:23

## 2018-05-07 RX ADMIN — Medication SCH: at 11:32

## 2018-05-07 RX ADMIN — INSULIN HUMAN PRN UNIT: 100 INJECTION, SOLUTION PARENTERAL at 05:54

## 2018-05-07 RX ADMIN — INSULIN HUMAN PRN UNIT: 100 INJECTION, SOLUTION PARENTERAL at 00:33

## 2018-05-07 RX ADMIN — HYDROCORTISONE SODIUM SUCCINATE SCH MG: 100 INJECTION, POWDER, FOR SOLUTION INTRAMUSCULAR; INTRAVENOUS at 23:09

## 2018-05-07 RX ADMIN — HYDROCORTISONE SODIUM SUCCINATE SCH MG: 100 INJECTION, POWDER, FOR SOLUTION INTRAMUSCULAR; INTRAVENOUS at 11:55

## 2018-05-07 RX ADMIN — FENTANYL CITRATE SCH MLS: 50 INJECTION, SOLUTION INTRAMUSCULAR; INTRAVENOUS at 05:26

## 2018-05-07 RX ADMIN — INSULIN HUMAN PRN UNIT: 100 INJECTION, SOLUTION PARENTERAL at 11:55

## 2018-05-07 RX ADMIN — Medication SCH ML: at 20:36

## 2018-05-07 RX ADMIN — HYDROCORTISONE SODIUM SUCCINATE SCH MG: 100 INJECTION, POWDER, FOR SOLUTION INTRAMUSCULAR; INTRAVENOUS at 05:42

## 2018-05-07 RX ADMIN — Medication SCH GM: at 19:12

## 2018-05-07 RX ADMIN — INSULIN HUMAN PRN UNIT: 100 INJECTION, SOLUTION PARENTERAL at 18:23

## 2018-05-07 RX ADMIN — CLINDAMYCIN PHOSPHATE SCH MLS: 900 INJECTION, SOLUTION INTRAVENOUS at 00:24

## 2018-05-07 RX ADMIN — Medication SCH GM: at 06:21

## 2018-05-07 RX ADMIN — Medication SCH ML: at 00:26

## 2018-05-07 RX ADMIN — HYDROCORTISONE SODIUM SUCCINATE SCH MG: 100 INJECTION, POWDER, FOR SOLUTION INTRAMUSCULAR; INTRAVENOUS at 00:24

## 2018-05-07 RX ADMIN — CLINDAMYCIN PHOSPHATE SCH MLS: 900 INJECTION, SOLUTION INTRAVENOUS at 23:09

## 2018-05-08 LAB
ALBUMIN SERPL BCG-MCNC: 2.5 G/DL (ref 3.4–4.8)
ALP SERPL-CCNC: 144 U/L (ref 40–150)
ALT SERPL W P-5'-P-CCNC: 54 U/L (ref 8–55)
ANALYZER IN CARDIO: (no result)
ANION GAP SERPL CALC-SCNC: 12 MMOL/L (ref 10–20)
AST SERPL-CCNC: 134 U/L (ref 5–34)
BASE EXCESS STD BLDA CALC-SCNC: 13.8 MEQ/L
BILIRUB SERPL-MCNC: 1.3 MG/DL (ref 0.2–1.2)
BUN SERPL-MCNC: 32 MG/DL (ref 9.8–20.1)
CA-I BLDA-SCNC: 1 MMOL/L (ref 1.12–1.3)
CALCIUM SERPL-MCNC: 7.7 MG/DL (ref 7.8–10.44)
CHLORIDE SERPL-SCNC: 98 MMOL/L (ref 98–107)
CO2 SERPL-SCNC: 37 MMOL/L (ref 23–31)
CREAT CL PREDICTED SERPL C-G-VRATE: 108 ML/MIN (ref 70–130)
GLOBULIN SER CALC-MCNC: 2.3 G/DL (ref 2.4–3.5)
GLUCOSE SERPL-MCNC: 165 MG/DL (ref 83–110)
HCO3 BLDA-SCNC: 38.5 MEQ/L (ref 22–26)
HCT VFR BLDA CALC: 28.9 % (ref 36–47)
HGB BLD-MCNC: 9.9 G/DL (ref 12–16)
HGB BLDA-MCNC: 9.3 G/DL (ref 12–16)
MAGNESIUM SERPL-MCNC: 2 MG/DL (ref 1.6–2.6)
MCH RBC QN AUTO: 33.2 PG (ref 27–31)
MCV RBC AUTO: 97.4 FL (ref 81–99)
MDIFF COMPLETE?: YES
O2 A-A PPRESDIFF RESPIRATORY: 123.17 MM[HG] (ref 0–20)
PCO2 BLDA: 50.3 MMHG (ref 35–45)
PH BLDA: 7.5 [PH] (ref 7.35–7.45)
PLATELET # BLD AUTO: 109 THOU/UL (ref 130–400)
PLATELET BLD QL SMEAR: (no result)
PO2 BLDA: 63.5 MMHG (ref 80–100)
POTASSIUM SERPL-SCNC: 3.1 MMOL/L (ref 3.5–5.1)
RBC # BLD AUTO: 2.98 MILL/UL (ref 4.2–5.4)
SODIUM SERPL-SCNC: 144 MMOL/L (ref 136–145)
SPECIMEN DRAWN FROM PATIENT: (no result)
WBC # BLD AUTO: 19.6 THOU/UL (ref 4.8–10.8)

## 2018-05-08 RX ADMIN — Medication SCH GM: at 05:32

## 2018-05-08 RX ADMIN — Medication SCH: at 05:26

## 2018-05-08 RX ADMIN — AMIODARONE HYDROCHLORIDE SCH MLS: 50 INJECTION, SOLUTION INTRAVENOUS at 18:26

## 2018-05-08 RX ADMIN — CLINDAMYCIN PHOSPHATE SCH MLS: 900 INJECTION, SOLUTION INTRAVENOUS at 05:24

## 2018-05-08 RX ADMIN — Medication SCH: at 18:14

## 2018-05-08 RX ADMIN — HYDROCORTISONE SODIUM SUCCINATE SCH MG: 100 INJECTION, POWDER, FOR SOLUTION INTRAMUSCULAR; INTRAVENOUS at 18:27

## 2018-05-08 RX ADMIN — CLINDAMYCIN PHOSPHATE SCH MLS: 900 INJECTION, SOLUTION INTRAVENOUS at 18:27

## 2018-05-08 RX ADMIN — Medication SCH GM: at 15:33

## 2018-05-08 RX ADMIN — CLINDAMYCIN PHOSPHATE SCH MLS: 900 INJECTION, SOLUTION INTRAVENOUS at 12:32

## 2018-05-08 RX ADMIN — Medication SCH: at 12:32

## 2018-05-08 RX ADMIN — Medication SCH ML: at 21:45

## 2018-05-08 RX ADMIN — POTASSIUM CHLORIDE PRN MLS: 29.8 INJECTION, SOLUTION INTRAVENOUS at 08:48

## 2018-05-08 RX ADMIN — Medication SCH GM: at 21:45

## 2018-05-08 RX ADMIN — Medication SCH ML: at 08:38

## 2018-05-08 RX ADMIN — HYDROCORTISONE SODIUM SUCCINATE SCH MG: 100 INJECTION, POWDER, FOR SOLUTION INTRAMUSCULAR; INTRAVENOUS at 14:26

## 2018-05-08 RX ADMIN — HYDROCORTISONE SODIUM SUCCINATE SCH MG: 100 INJECTION, POWDER, FOR SOLUTION INTRAMUSCULAR; INTRAVENOUS at 05:32

## 2018-05-09 LAB
ALBUMIN SERPL BCG-MCNC: 2.5 G/DL (ref 3.4–4.8)
ALP SERPL-CCNC: 123 U/L (ref 40–150)
ALT SERPL W P-5'-P-CCNC: 41 U/L (ref 8–55)
ANION GAP SERPL CALC-SCNC: 10 MMOL/L (ref 10–20)
AST SERPL-CCNC: 72 U/L (ref 5–34)
BILIRUB SERPL-MCNC: 0.9 MG/DL (ref 0.2–1.2)
BUN SERPL-MCNC: 32 MG/DL (ref 9.8–20.1)
CALCIUM SERPL-MCNC: 7.4 MG/DL (ref 7.8–10.44)
CHLORIDE SERPL-SCNC: 99 MMOL/L (ref 98–107)
CO2 SERPL-SCNC: 37 MMOL/L (ref 23–31)
CREAT CL PREDICTED SERPL C-G-VRATE: 105 ML/MIN (ref 70–130)
GLOBULIN SER CALC-MCNC: 2.3 G/DL (ref 2.4–3.5)
GLUCOSE SERPL-MCNC: 159 MG/DL (ref 83–110)
HGB BLD-MCNC: 9.1 G/DL (ref 12–16)
MAGNESIUM SERPL-MCNC: 1.8 MG/DL (ref 1.6–2.6)
MCH RBC QN AUTO: 32.4 PG (ref 27–31)
MCV RBC AUTO: 97.8 FL (ref 81–99)
MDIFF COMPLETE?: YES
PLATELET # BLD AUTO: 130 THOU/UL (ref 130–400)
PLATELET BLD QL SMEAR: (no result)
POTASSIUM SERPL-SCNC: 2.8 MMOL/L (ref 3.5–5.1)
POTASSIUM SERPL-SCNC: 5.7 MMOL/L (ref 3.5–5.1)
RBC # BLD AUTO: 2.8 MILL/UL (ref 4.2–5.4)
SODIUM SERPL-SCNC: 143 MMOL/L (ref 136–145)
WBC # BLD AUTO: 17.1 THOU/UL (ref 4.8–10.8)

## 2018-05-09 RX ADMIN — Medication SCH GM: at 13:43

## 2018-05-09 RX ADMIN — CLINDAMYCIN PHOSPHATE SCH MLS: 900 INJECTION, SOLUTION INTRAVENOUS at 05:18

## 2018-05-09 RX ADMIN — Medication SCH: at 05:18

## 2018-05-09 RX ADMIN — Medication SCH: at 00:27

## 2018-05-09 RX ADMIN — POTASSIUM CHLORIDE PRN MLS: 29.8 INJECTION, SOLUTION INTRAVENOUS at 07:59

## 2018-05-09 RX ADMIN — CLINDAMYCIN PHOSPHATE SCH MLS: 900 INJECTION, SOLUTION INTRAVENOUS at 17:01

## 2018-05-09 RX ADMIN — CLINDAMYCIN PHOSPHATE SCH MLS: 900 INJECTION, SOLUTION INTRAVENOUS at 12:06

## 2018-05-09 RX ADMIN — CLINDAMYCIN PHOSPHATE SCH MLS: 900 INJECTION, SOLUTION INTRAVENOUS at 00:27

## 2018-05-09 RX ADMIN — HYDROCORTISONE SODIUM SUCCINATE SCH MG: 100 INJECTION, POWDER, FOR SOLUTION INTRAMUSCULAR; INTRAVENOUS at 00:27

## 2018-05-09 RX ADMIN — Medication SCH ML: at 21:15

## 2018-05-09 RX ADMIN — HYDROCORTISONE SODIUM SUCCINATE SCH MG: 100 INJECTION, POWDER, FOR SOLUTION INTRAMUSCULAR; INTRAVENOUS at 17:00

## 2018-05-09 RX ADMIN — Medication SCH: at 23:49

## 2018-05-09 RX ADMIN — AMIODARONE HYDROCHLORIDE SCH MLS: 50 INJECTION, SOLUTION INTRAVENOUS at 12:09

## 2018-05-09 RX ADMIN — HYDROCORTISONE SODIUM SUCCINATE SCH MG: 100 INJECTION, POWDER, FOR SOLUTION INTRAMUSCULAR; INTRAVENOUS at 05:18

## 2018-05-09 RX ADMIN — Medication SCH: at 17:24

## 2018-05-09 RX ADMIN — HYDROCORTISONE SODIUM SUCCINATE SCH MG: 100 INJECTION, POWDER, FOR SOLUTION INTRAMUSCULAR; INTRAVENOUS at 12:29

## 2018-05-09 RX ADMIN — CLINDAMYCIN PHOSPHATE SCH MLS: 900 INJECTION, SOLUTION INTRAVENOUS at 23:45

## 2018-05-09 RX ADMIN — Medication SCH GM: at 05:17

## 2018-05-09 RX ADMIN — Medication SCH ML: at 09:25

## 2018-05-09 RX ADMIN — Medication SCH GM: at 21:14

## 2018-05-09 RX ADMIN — Medication SCH: at 13:40

## 2018-05-09 RX ADMIN — HYDROCORTISONE SODIUM SUCCINATE SCH MG: 100 INJECTION, POWDER, FOR SOLUTION INTRAMUSCULAR; INTRAVENOUS at 23:45

## 2018-05-10 LAB
ALBUMIN SERPL BCG-MCNC: 2.7 G/DL (ref 3.4–4.8)
ALP SERPL-CCNC: 140 U/L (ref 40–150)
ALT SERPL W P-5'-P-CCNC: 40 U/L (ref 8–55)
ANALYZER IN CARDIO: (no result)
ANION GAP SERPL CALC-SCNC: 8 MMOL/L (ref 10–20)
AST SERPL-CCNC: 75 U/L (ref 5–34)
BASE EXCESS STD BLDA CALC-SCNC: 9 MEQ/L
BILIRUB SERPL-MCNC: 0.9 MG/DL (ref 0.2–1.2)
BUN SERPL-MCNC: 29 MG/DL (ref 9.8–20.1)
CA-I BLDA-SCNC: 1 MMOL/L (ref 1.12–1.3)
CALCIUM SERPL-MCNC: 7.7 MG/DL (ref 7.8–10.44)
CHLORIDE SERPL-SCNC: 103 MMOL/L (ref 98–107)
CO2 SERPL-SCNC: 34 MMOL/L (ref 23–31)
CREAT CL PREDICTED SERPL C-G-VRATE: 114 ML/MIN (ref 70–130)
GLOBULIN SER CALC-MCNC: 2.4 G/DL (ref 2.4–3.5)
GLUCOSE SERPL-MCNC: 146 MG/DL (ref 83–110)
HCO3 BLDA-SCNC: 30.9 MEQ/L (ref 22–26)
HCT VFR BLDA CALC: 33.6 % (ref 36–47)
HGB BLD-MCNC: 9.3 G/DL (ref 12–16)
HGB BLDA-MCNC: 11.3 G/DL (ref 12–16)
MAGNESIUM SERPL-MCNC: 2 MG/DL (ref 1.6–2.6)
MCH RBC QN AUTO: 33.2 PG (ref 27–31)
MCV RBC AUTO: 99.3 FL (ref 81–99)
MDIFF COMPLETE?: YES
O2 A-A PPRESDIFF RESPIRATORY: 154.5 MM[HG] (ref 0–20)
PCO2 BLDA: 32.7 MMHG (ref 35–45)
PH BLDA: 7.59 [PH] (ref 7.35–7.45)
PLATELET # BLD AUTO: 155 THOU/UL (ref 130–400)
PO2 BLDA: 59.5 MMHG (ref 80–100)
POTASSIUM SERPL-SCNC: 2.3 MMOL/L (ref 3.5–5.1)
POTASSIUM SERPL-SCNC: 2.3 MMOL/L (ref 3.5–5.1)
RBC # BLD AUTO: 2.81 MILL/UL (ref 4.2–5.4)
SODIUM SERPL-SCNC: 143 MMOL/L (ref 136–145)
SPECIMEN DRAWN FROM PATIENT: (no result)
WBC # BLD AUTO: 15.7 THOU/UL (ref 4.8–10.8)

## 2018-05-10 PROCEDURE — 2W0DX4Z CHANGE BANDAGE ON LEFT LOWER ARM: ICD-10-PCS | Performed by: ORTHOPAEDIC SURGERY

## 2018-05-10 RX ADMIN — POTASSIUM CHLORIDE SCH MLS: 14.9 INJECTION, SOLUTION INTRAVENOUS at 09:54

## 2018-05-10 RX ADMIN — Medication SCH ML: at 21:32

## 2018-05-10 RX ADMIN — HYDROCORTISONE SODIUM SUCCINATE SCH MG: 100 INJECTION, POWDER, FOR SOLUTION INTRAMUSCULAR; INTRAVENOUS at 21:22

## 2018-05-10 RX ADMIN — Medication SCH: at 13:46

## 2018-05-10 RX ADMIN — Medication SCH GM: at 22:24

## 2018-05-10 RX ADMIN — Medication SCH GM: at 05:30

## 2018-05-10 RX ADMIN — HYDROCORTISONE SODIUM SUCCINATE SCH MG: 100 INJECTION, POWDER, FOR SOLUTION INTRAMUSCULAR; INTRAVENOUS at 05:31

## 2018-05-10 RX ADMIN — CLINDAMYCIN PHOSPHATE SCH MLS: 900 INJECTION, SOLUTION INTRAVENOUS at 05:30

## 2018-05-10 RX ADMIN — Medication SCH: at 18:43

## 2018-05-10 RX ADMIN — CLINDAMYCIN PHOSPHATE SCH: 900 INJECTION, SOLUTION INTRAVENOUS at 13:37

## 2018-05-10 RX ADMIN — VANCOMYCIN HYDROCHLORIDE SCH MG: KIT at 14:49

## 2018-05-10 RX ADMIN — POTASSIUM CHLORIDE SCH MLS: 14.9 INJECTION, SOLUTION INTRAVENOUS at 08:00

## 2018-05-10 RX ADMIN — HYDROCORTISONE SODIUM SUCCINATE SCH MG: 100 INJECTION, POWDER, FOR SOLUTION INTRAMUSCULAR; INTRAVENOUS at 09:45

## 2018-05-10 RX ADMIN — Medication SCH GM: at 14:27

## 2018-05-10 RX ADMIN — AMIODARONE HYDROCHLORIDE SCH MLS: 50 INJECTION, SOLUTION INTRAVENOUS at 02:31

## 2018-05-10 RX ADMIN — Medication PRN ML: at 14:27

## 2018-05-10 RX ADMIN — Medication SCH ML: at 09:54

## 2018-05-10 RX ADMIN — VANCOMYCIN HYDROCHLORIDE SCH MG: KIT at 22:24

## 2018-05-10 RX ADMIN — AMIODARONE HYDROCHLORIDE SCH MLS: 50 INJECTION, SOLUTION INTRAVENOUS at 16:17

## 2018-05-10 RX ADMIN — Medication SCH: at 06:09

## 2018-05-11 LAB
ALBUMIN SERPL BCG-MCNC: 2.5 G/DL (ref 3.4–4.8)
ALP SERPL-CCNC: 113 U/L (ref 40–150)
ALT SERPL W P-5'-P-CCNC: 27 U/L (ref 8–55)
ANION GAP SERPL CALC-SCNC: 8 MMOL/L (ref 10–20)
AST SERPL-CCNC: 46 U/L (ref 5–34)
BASOPHILS # BLD AUTO: 0.1 THOU/UL (ref 0–0.2)
BASOPHILS NFR BLD AUTO: 0.6 % (ref 0–1)
BILIRUB SERPL-MCNC: 0.9 MG/DL (ref 0.2–1.2)
BUN SERPL-MCNC: 24 MG/DL (ref 9.8–20.1)
CALCIUM SERPL-MCNC: 7.8 MG/DL (ref 7.8–10.44)
CHLORIDE SERPL-SCNC: 105 MMOL/L (ref 98–107)
CO2 SERPL-SCNC: 34 MMOL/L (ref 23–31)
CREAT CL PREDICTED SERPL C-G-VRATE: 121 ML/MIN (ref 70–130)
EOSINOPHIL # BLD AUTO: 0 THOU/UL (ref 0–0.7)
EOSINOPHIL NFR BLD AUTO: 0.4 % (ref 0–10)
GLOBULIN SER CALC-MCNC: 2.3 G/DL (ref 2.4–3.5)
GLUCOSE SERPL-MCNC: 121 MG/DL (ref 83–110)
HGB BLD-MCNC: 9.5 G/DL (ref 12–16)
LYMPHOCYTES # BLD: 1.8 THOU/UL (ref 1.2–3.4)
LYMPHOCYTES NFR BLD AUTO: 15.3 % (ref 21–51)
MAGNESIUM SERPL-MCNC: 1.7 MG/DL (ref 1.6–2.6)
MCH RBC QN AUTO: 33.4 PG (ref 27–31)
MCV RBC AUTO: 97.6 FL (ref 81–99)
MONOCYTES # BLD AUTO: 0.5 THOU/UL (ref 0.11–0.59)
MONOCYTES NFR BLD AUTO: 3.9 % (ref 0–10)
NEUTROPHILS # BLD AUTO: 9.3 THOU/UL (ref 1.4–6.5)
NEUTROPHILS NFR BLD AUTO: 79.8 % (ref 42–75)
PLATELET # BLD AUTO: 184 THOU/UL (ref 130–400)
POTASSIUM SERPL-SCNC: 2.9 MMOL/L (ref 3.5–5.1)
POTASSIUM SERPL-SCNC: 2.9 MMOL/L (ref 3.5–5.1)
RBC # BLD AUTO: 2.83 MILL/UL (ref 4.2–5.4)
SODIUM SERPL-SCNC: 144 MMOL/L (ref 136–145)
WBC # BLD AUTO: 11.6 THOU/UL (ref 4.8–10.8)

## 2018-05-11 RX ADMIN — AMIODARONE HYDROCHLORIDE SCH MLS: 50 INJECTION, SOLUTION INTRAVENOUS at 10:29

## 2018-05-11 RX ADMIN — Medication SCH ML: at 22:05

## 2018-05-11 RX ADMIN — Medication SCH: at 00:51

## 2018-05-11 RX ADMIN — VANCOMYCIN HYDROCHLORIDE SCH MG: KIT at 12:47

## 2018-05-11 RX ADMIN — POTASSIUM CHLORIDE PRN MLS: 29.8 INJECTION, SOLUTION INTRAVENOUS at 13:52

## 2018-05-11 RX ADMIN — Medication SCH: at 12:00

## 2018-05-11 RX ADMIN — Medication SCH: at 18:00

## 2018-05-11 RX ADMIN — POTASSIUM CHLORIDE PRN MLS: 29.8 INJECTION, SOLUTION INTRAVENOUS at 21:22

## 2018-05-11 RX ADMIN — HYDROCORTISONE SODIUM SUCCINATE SCH MG: 100 INJECTION, POWDER, FOR SOLUTION INTRAMUSCULAR; INTRAVENOUS at 10:18

## 2018-05-11 RX ADMIN — VANCOMYCIN HYDROCHLORIDE SCH MG: KIT at 00:51

## 2018-05-11 RX ADMIN — Medication SCH ML: at 10:22

## 2018-05-11 RX ADMIN — VANCOMYCIN HYDROCHLORIDE SCH MG: KIT at 18:35

## 2018-05-11 RX ADMIN — Medication SCH: at 22:28

## 2018-05-11 RX ADMIN — VANCOMYCIN HYDROCHLORIDE SCH MG: KIT at 08:01

## 2018-05-11 RX ADMIN — Medication SCH ML: at 12:49

## 2018-05-11 RX ADMIN — Medication SCH GM: at 21:23

## 2018-05-11 RX ADMIN — Medication SCH GM: at 13:52

## 2018-05-11 RX ADMIN — HYDROCORTISONE SODIUM SUCCINATE SCH MG: 100 INJECTION, POWDER, FOR SOLUTION INTRAMUSCULAR; INTRAVENOUS at 21:22

## 2018-05-11 RX ADMIN — Medication SCH ML: at 18:34

## 2018-05-11 RX ADMIN — Medication SCH GM: at 05:49

## 2018-05-12 LAB
ALBUMIN SERPL BCG-MCNC: 2.6 G/DL (ref 3.4–4.8)
ALP SERPL-CCNC: 108 U/L (ref 40–150)
ALT SERPL W P-5'-P-CCNC: 22 U/L (ref 8–55)
ANION GAP SERPL CALC-SCNC: 9 MMOL/L (ref 10–20)
AST SERPL-CCNC: 48 U/L (ref 5–34)
BASOPHILS # BLD AUTO: 0 THOU/UL (ref 0–0.2)
BASOPHILS NFR BLD AUTO: 0.1 % (ref 0–1)
BILIRUB SERPL-MCNC: 0.9 MG/DL (ref 0.2–1.2)
BUN SERPL-MCNC: 19 MG/DL (ref 9.8–20.1)
CALCIUM SERPL-MCNC: 7.6 MG/DL (ref 7.8–10.44)
CHLORIDE SERPL-SCNC: 103 MMOL/L (ref 98–107)
CO2 SERPL-SCNC: 31 MMOL/L (ref 23–31)
CREAT CL PREDICTED SERPL C-G-VRATE: 136 ML/MIN (ref 70–130)
EOSINOPHIL # BLD AUTO: 0 THOU/UL (ref 0–0.7)
EOSINOPHIL NFR BLD AUTO: 0.5 % (ref 0–10)
GLOBULIN SER CALC-MCNC: 2.4 G/DL (ref 2.4–3.5)
GLUCOSE SERPL-MCNC: 103 MG/DL (ref 83–110)
HGB BLD-MCNC: 9.8 G/DL (ref 12–16)
LYMPHOCYTES # BLD: 1.3 THOU/UL (ref 1.2–3.4)
LYMPHOCYTES NFR BLD AUTO: 13.6 % (ref 21–51)
MAGNESIUM SERPL-MCNC: 1.7 MG/DL (ref 1.6–2.6)
MCH RBC QN AUTO: 33.7 PG (ref 27–31)
MCV RBC AUTO: 98.9 FL (ref 81–99)
MONOCYTES # BLD AUTO: 0.4 THOU/UL (ref 0.11–0.59)
MONOCYTES NFR BLD AUTO: 3.9 % (ref 0–10)
NEUTROPHILS # BLD AUTO: 7.6 THOU/UL (ref 1.4–6.5)
NEUTROPHILS NFR BLD AUTO: 82 % (ref 42–75)
PLATELET # BLD AUTO: 200 THOU/UL (ref 130–400)
POTASSIUM SERPL-SCNC: 3.3 MMOL/L (ref 3.5–5.1)
RBC # BLD AUTO: 2.91 MILL/UL (ref 4.2–5.4)
SODIUM SERPL-SCNC: 140 MMOL/L (ref 136–145)
WBC # BLD AUTO: 9.3 THOU/UL (ref 4.8–10.8)

## 2018-05-12 RX ADMIN — Medication SCH: at 22:50

## 2018-05-12 RX ADMIN — Medication SCH GM: at 22:49

## 2018-05-12 RX ADMIN — Medication SCH GM: at 06:05

## 2018-05-12 RX ADMIN — Medication SCH: at 17:52

## 2018-05-12 RX ADMIN — HYDROCORTISONE SODIUM SUCCINATE SCH MG: 100 INJECTION, POWDER, FOR SOLUTION INTRAMUSCULAR; INTRAVENOUS at 22:49

## 2018-05-12 RX ADMIN — Medication SCH ML: at 22:50

## 2018-05-12 RX ADMIN — VANCOMYCIN HYDROCHLORIDE SCH MG: KIT at 00:45

## 2018-05-12 RX ADMIN — Medication SCH: at 13:21

## 2018-05-12 RX ADMIN — Medication SCH ML: at 09:37

## 2018-05-12 RX ADMIN — POTASSIUM CHLORIDE PRN MLS: 29.8 INJECTION, SOLUTION INTRAVENOUS at 06:06

## 2018-05-12 RX ADMIN — HYDROCORTISONE SODIUM SUCCINATE SCH MG: 100 INJECTION, POWDER, FOR SOLUTION INTRAMUSCULAR; INTRAVENOUS at 07:58

## 2018-05-12 RX ADMIN — VANCOMYCIN HYDROCHLORIDE SCH MG: KIT at 17:17

## 2018-05-12 RX ADMIN — VANCOMYCIN HYDROCHLORIDE SCH MG: KIT at 13:20

## 2018-05-12 RX ADMIN — Medication SCH GM: at 14:31

## 2018-05-12 RX ADMIN — VANCOMYCIN HYDROCHLORIDE SCH MG: KIT at 06:05

## 2018-05-13 LAB
ALBUMIN SERPL BCG-MCNC: 2.6 G/DL (ref 3.4–4.8)
ALP SERPL-CCNC: 96 U/L (ref 40–150)
ALT SERPL W P-5'-P-CCNC: 20 U/L (ref 8–55)
ANION GAP SERPL CALC-SCNC: 9 MMOL/L (ref 10–20)
AST SERPL-CCNC: 45 U/L (ref 5–34)
BASOPHILS # BLD AUTO: 0 THOU/UL (ref 0–0.2)
BASOPHILS NFR BLD AUTO: 0.1 % (ref 0–1)
BILIRUB SERPL-MCNC: 0.8 MG/DL (ref 0.2–1.2)
BUN SERPL-MCNC: 17 MG/DL (ref 9.8–20.1)
CALCIUM SERPL-MCNC: 7.5 MG/DL (ref 7.8–10.44)
CHLORIDE SERPL-SCNC: 104 MMOL/L (ref 98–107)
CO2 SERPL-SCNC: 30 MMOL/L (ref 23–31)
CREAT CL PREDICTED SERPL C-G-VRATE: 136 ML/MIN (ref 70–130)
EOSINOPHIL # BLD AUTO: 0 THOU/UL (ref 0–0.7)
EOSINOPHIL NFR BLD AUTO: 0.5 % (ref 0–10)
GLOBULIN SER CALC-MCNC: 2.3 G/DL (ref 2.4–3.5)
GLUCOSE SERPL-MCNC: 116 MG/DL (ref 83–110)
HGB BLD-MCNC: 9.6 G/DL (ref 12–16)
LYMPHOCYTES # BLD: 1.3 THOU/UL (ref 1.2–3.4)
LYMPHOCYTES NFR BLD AUTO: 18.2 % (ref 21–51)
MAGNESIUM SERPL-MCNC: 1.6 MG/DL (ref 1.6–2.6)
MCH RBC QN AUTO: 34.1 PG (ref 27–31)
MCV RBC AUTO: 99.7 FL (ref 81–99)
MONOCYTES # BLD AUTO: 0.4 THOU/UL (ref 0.11–0.59)
MONOCYTES NFR BLD AUTO: 5.3 % (ref 0–10)
NEUTROPHILS # BLD AUTO: 5.4 THOU/UL (ref 1.4–6.5)
NEUTROPHILS NFR BLD AUTO: 75.9 % (ref 42–75)
PLATELET # BLD AUTO: 236 THOU/UL (ref 130–400)
POTASSIUM SERPL-SCNC: 3.2 MMOL/L (ref 3.5–5.1)
RBC # BLD AUTO: 2.8 MILL/UL (ref 4.2–5.4)
SODIUM SERPL-SCNC: 140 MMOL/L (ref 136–145)
WBC # BLD AUTO: 7.1 THOU/UL (ref 4.8–10.8)

## 2018-05-13 RX ADMIN — VANCOMYCIN HYDROCHLORIDE SCH MG: KIT at 00:15

## 2018-05-13 RX ADMIN — VANCOMYCIN HYDROCHLORIDE SCH MG: KIT at 21:14

## 2018-05-13 RX ADMIN — Medication SCH GM: at 22:32

## 2018-05-13 RX ADMIN — HYDROCORTISONE SODIUM SUCCINATE SCH MG: 100 INJECTION, POWDER, FOR SOLUTION INTRAMUSCULAR; INTRAVENOUS at 21:16

## 2018-05-13 RX ADMIN — Medication SCH GM: at 05:32

## 2018-05-13 RX ADMIN — HYDROCORTISONE SODIUM SUCCINATE SCH MG: 100 INJECTION, POWDER, FOR SOLUTION INTRAMUSCULAR; INTRAVENOUS at 09:42

## 2018-05-13 RX ADMIN — Medication SCH ML: at 21:15

## 2018-05-13 RX ADMIN — POTASSIUM CHLORIDE PRN MLS: 29.8 INJECTION, SOLUTION INTRAVENOUS at 08:04

## 2018-05-13 RX ADMIN — Medication SCH GM: at 14:59

## 2018-05-13 RX ADMIN — Medication SCH: at 21:14

## 2018-05-13 RX ADMIN — Medication SCH ML: at 09:42

## 2018-05-13 RX ADMIN — VANCOMYCIN HYDROCHLORIDE SCH MG: KIT at 13:31

## 2018-05-13 RX ADMIN — Medication SCH ML: at 21:14

## 2018-05-13 RX ADMIN — VANCOMYCIN HYDROCHLORIDE SCH MG: KIT at 05:32

## 2018-05-13 RX ADMIN — Medication SCH: at 12:00

## 2018-05-14 LAB
ALBUMIN SERPL BCG-MCNC: 2.6 G/DL (ref 3.4–4.8)
ALP SERPL-CCNC: 84 U/L (ref 40–150)
ALT SERPL W P-5'-P-CCNC: 17 U/L (ref 8–55)
ANION GAP SERPL CALC-SCNC: 8 MMOL/L (ref 10–20)
AST SERPL-CCNC: 38 U/L (ref 5–34)
BASOPHILS # BLD AUTO: 0 THOU/UL (ref 0–0.2)
BASOPHILS NFR BLD AUTO: 0.6 % (ref 0–1)
BILIRUB SERPL-MCNC: 0.7 MG/DL (ref 0.2–1.2)
BUN SERPL-MCNC: 19 MG/DL (ref 9.8–20.1)
CALCIUM SERPL-MCNC: 7.7 MG/DL (ref 7.8–10.44)
CHLORIDE SERPL-SCNC: 106 MMOL/L (ref 98–107)
CO2 SERPL-SCNC: 29 MMOL/L (ref 23–31)
CREAT CL PREDICTED SERPL C-G-VRATE: 121 ML/MIN (ref 70–130)
EOSINOPHIL # BLD AUTO: 0 THOU/UL (ref 0–0.7)
EOSINOPHIL NFR BLD AUTO: 0.5 % (ref 0–10)
GLOBULIN SER CALC-MCNC: 2.3 G/DL (ref 2.4–3.5)
GLUCOSE SERPL-MCNC: 101 MG/DL (ref 83–110)
HGB BLD-MCNC: 8.9 G/DL (ref 12–16)
LYMPHOCYTES # BLD: 1.4 THOU/UL (ref 1.2–3.4)
LYMPHOCYTES NFR BLD AUTO: 22.7 % (ref 21–51)
MAGNESIUM SERPL-MCNC: 1.7 MG/DL (ref 1.6–2.6)
MCH RBC QN AUTO: 34.2 PG (ref 27–31)
MCV RBC AUTO: 101 FL (ref 81–99)
MONOCYTES # BLD AUTO: 0.4 THOU/UL (ref 0.11–0.59)
MONOCYTES NFR BLD AUTO: 6.2 % (ref 0–10)
NEUTROPHILS # BLD AUTO: 4.4 THOU/UL (ref 1.4–6.5)
NEUTROPHILS NFR BLD AUTO: 70.1 % (ref 42–75)
PLATELET # BLD AUTO: 230 THOU/UL (ref 130–400)
POTASSIUM SERPL-SCNC: 3.8 MMOL/L (ref 3.5–5.1)
RBC # BLD AUTO: 2.59 MILL/UL (ref 4.2–5.4)
SODIUM SERPL-SCNC: 139 MMOL/L (ref 136–145)
WBC # BLD AUTO: 6.3 THOU/UL (ref 4.8–10.8)

## 2018-05-14 PROCEDURE — 0HREX74 REPLACEMENT OF LEFT LOWER ARM SKIN WITH AUTOLOGOUS TISSUE SUBSTITUTE, PARTIAL THICKNESS, EXTERNAL APPROACH: ICD-10-PCS | Performed by: ORTHOPAEDIC SURGERY

## 2018-05-14 PROCEDURE — 0HBJXZZ EXCISION OF LEFT UPPER LEG SKIN, EXTERNAL APPROACH: ICD-10-PCS | Performed by: ORTHOPAEDIC SURGERY

## 2018-05-14 PROCEDURE — 0PBS0ZZ EXCISION OF LEFT THUMB PHALANX, OPEN APPROACH: ICD-10-PCS | Performed by: ORTHOPAEDIC SURGERY

## 2018-05-14 PROCEDURE — 0XQFXZZ REPAIR LEFT LOWER ARM, EXTERNAL APPROACH: ICD-10-PCS | Performed by: ORTHOPAEDIC SURGERY

## 2018-05-14 PROCEDURE — 0HRGXJZ REPLACEMENT OF LEFT HAND SKIN WITH SYNTHETIC SUBSTITUTE, EXTERNAL APPROACH: ICD-10-PCS | Performed by: ORTHOPAEDIC SURGERY

## 2018-05-14 RX ADMIN — VANCOMYCIN HYDROCHLORIDE SCH MG: KIT at 23:58

## 2018-05-14 RX ADMIN — VANCOMYCIN HYDROCHLORIDE SCH MG: KIT at 15:39

## 2018-05-14 RX ADMIN — Medication SCH ML: at 08:16

## 2018-05-14 RX ADMIN — Medication SCH: at 01:06

## 2018-05-14 RX ADMIN — Medication SCH GM: at 23:59

## 2018-05-14 RX ADMIN — VANCOMYCIN HYDROCHLORIDE SCH MG: KIT at 08:15

## 2018-05-14 RX ADMIN — VANCOMYCIN HYDROCHLORIDE SCH: KIT at 01:06

## 2018-05-14 RX ADMIN — HYDROCORTISONE SODIUM SUCCINATE SCH MG: 100 INJECTION, POWDER, FOR SOLUTION INTRAMUSCULAR; INTRAVENOUS at 08:16

## 2018-05-14 RX ADMIN — VANCOMYCIN HYDROCHLORIDE SCH: KIT at 17:37

## 2018-05-14 RX ADMIN — VANCOMYCIN HYDROCHLORIDE SCH MG: KIT at 03:03

## 2018-05-14 RX ADMIN — Medication SCH GM: at 05:42

## 2018-05-14 RX ADMIN — Medication SCH GM: at 15:41

## 2018-05-15 LAB
ALBUMIN SERPL BCG-MCNC: 2.7 G/DL (ref 3.4–4.8)
ALP SERPL-CCNC: 78 U/L (ref 40–150)
ALT SERPL W P-5'-P-CCNC: 17 U/L (ref 8–55)
ANION GAP SERPL CALC-SCNC: 8 MMOL/L (ref 10–20)
AST SERPL-CCNC: 49 U/L (ref 5–34)
BASOPHILS # BLD AUTO: 0 THOU/UL (ref 0–0.2)
BASOPHILS NFR BLD AUTO: 0.3 % (ref 0–1)
BILIRUB SERPL-MCNC: 0.7 MG/DL (ref 0.2–1.2)
BUN SERPL-MCNC: 18 MG/DL (ref 9.8–20.1)
CALCIUM SERPL-MCNC: 7.8 MG/DL (ref 7.8–10.44)
CHLORIDE SERPL-SCNC: 106 MMOL/L (ref 98–107)
CO2 SERPL-SCNC: 27 MMOL/L (ref 23–31)
CREAT CL PREDICTED SERPL C-G-VRATE: 139 ML/MIN (ref 70–130)
EOSINOPHIL # BLD AUTO: 0.2 THOU/UL (ref 0–0.7)
EOSINOPHIL NFR BLD AUTO: 2.4 % (ref 0–10)
GLOBULIN SER CALC-MCNC: 2.4 G/DL (ref 2.4–3.5)
GLUCOSE SERPL-MCNC: 80 MG/DL (ref 83–110)
HGB BLD-MCNC: 9.2 G/DL (ref 12–16)
LYMPHOCYTES # BLD: 1.5 THOU/UL (ref 1.2–3.4)
LYMPHOCYTES NFR BLD AUTO: 20.7 % (ref 21–51)
MCH RBC QN AUTO: 33.5 PG (ref 27–31)
MCV RBC AUTO: 101 FL (ref 81–99)
MONOCYTES # BLD AUTO: 0.6 THOU/UL (ref 0.11–0.59)
MONOCYTES NFR BLD AUTO: 8.7 % (ref 0–10)
NEUTROPHILS # BLD AUTO: 4.9 THOU/UL (ref 1.4–6.5)
NEUTROPHILS NFR BLD AUTO: 67.9 % (ref 42–75)
PLATELET # BLD AUTO: 265 THOU/UL (ref 130–400)
POTASSIUM SERPL-SCNC: 3.4 MMOL/L (ref 3.5–5.1)
RBC # BLD AUTO: 2.76 MILL/UL (ref 4.2–5.4)
SODIUM SERPL-SCNC: 138 MMOL/L (ref 136–145)
WBC # BLD AUTO: 7.3 THOU/UL (ref 4.8–10.8)

## 2018-05-15 RX ADMIN — Medication SCH GM: at 06:49

## 2018-05-15 RX ADMIN — Medication SCH GM: at 23:27

## 2018-05-15 RX ADMIN — Medication SCH ML: at 23:28

## 2018-05-15 RX ADMIN — VANCOMYCIN HYDROCHLORIDE SCH MG: KIT at 10:25

## 2018-05-15 RX ADMIN — Medication SCH ML: at 10:28

## 2018-05-15 RX ADMIN — VANCOMYCIN HYDROCHLORIDE SCH MG: KIT at 23:27

## 2018-05-15 RX ADMIN — VANCOMYCIN HYDROCHLORIDE SCH MG: KIT at 14:52

## 2018-05-15 RX ADMIN — Medication SCH GM: at 14:52

## 2018-05-15 RX ADMIN — VANCOMYCIN HYDROCHLORIDE SCH MG: KIT at 04:47

## 2018-05-16 VITALS — SYSTOLIC BLOOD PRESSURE: 141 MMHG | DIASTOLIC BLOOD PRESSURE: 70 MMHG

## 2018-05-16 VITALS — TEMPERATURE: 98 F

## 2018-05-16 LAB
ALBUMIN SERPL BCG-MCNC: 2.5 G/DL (ref 3.4–4.8)
ALP SERPL-CCNC: 74 U/L (ref 40–150)
ALT SERPL W P-5'-P-CCNC: 12 U/L (ref 8–55)
ANION GAP SERPL CALC-SCNC: 11 MMOL/L (ref 10–20)
AST SERPL-CCNC: 42 U/L (ref 5–34)
BASOPHILS # BLD AUTO: 0 THOU/UL (ref 0–0.2)
BASOPHILS NFR BLD AUTO: 0.8 % (ref 0–1)
BILIRUB SERPL-MCNC: 0.7 MG/DL (ref 0.2–1.2)
BUN SERPL-MCNC: 13 MG/DL (ref 9.8–20.1)
CALCIUM SERPL-MCNC: 7.8 MG/DL (ref 7.8–10.44)
CHLORIDE SERPL-SCNC: 106 MMOL/L (ref 98–107)
CO2 SERPL-SCNC: 27 MMOL/L (ref 23–31)
CREAT CL PREDICTED SERPL C-G-VRATE: 134 ML/MIN (ref 70–130)
EOSINOPHIL # BLD AUTO: 0.2 THOU/UL (ref 0–0.7)
EOSINOPHIL NFR BLD AUTO: 4.5 % (ref 0–10)
GLOBULIN SER CALC-MCNC: 2.1 G/DL (ref 2.4–3.5)
GLUCOSE SERPL-MCNC: 87 MG/DL (ref 83–110)
HGB BLD-MCNC: 8.4 G/DL (ref 12–16)
LYMPHOCYTES # BLD: 1.6 THOU/UL (ref 1.2–3.4)
LYMPHOCYTES NFR BLD AUTO: 28.9 % (ref 21–51)
MCH RBC QN AUTO: 34.9 PG (ref 27–31)
MCV RBC AUTO: 101 FL (ref 81–99)
MONOCYTES # BLD AUTO: 0.6 THOU/UL (ref 0.11–0.59)
MONOCYTES NFR BLD AUTO: 11.3 % (ref 0–10)
NEUTROPHILS # BLD AUTO: 2.9 THOU/UL (ref 1.4–6.5)
NEUTROPHILS NFR BLD AUTO: 54.5 % (ref 42–75)
PLATELET # BLD AUTO: 278 THOU/UL (ref 130–400)
POTASSIUM SERPL-SCNC: 3.2 MMOL/L (ref 3.5–5.1)
RBC # BLD AUTO: 2.39 MILL/UL (ref 4.2–5.4)
SODIUM SERPL-SCNC: 141 MMOL/L (ref 136–145)
WBC # BLD AUTO: 5.4 THOU/UL (ref 4.8–10.8)

## 2018-05-16 RX ADMIN — VANCOMYCIN HYDROCHLORIDE SCH MG: KIT at 14:42

## 2018-05-16 RX ADMIN — Medication SCH GM: at 14:42

## 2018-05-16 RX ADMIN — Medication SCH ML: at 09:04

## 2018-05-16 RX ADMIN — VANCOMYCIN HYDROCHLORIDE SCH MG: KIT at 09:04

## 2018-05-16 RX ADMIN — VANCOMYCIN HYDROCHLORIDE SCH MG: KIT at 03:21

## 2018-05-16 RX ADMIN — Medication SCH GM: at 05:38

## 2018-05-18 LAB
ANALYZER IN CARDIO: (no result)
BASE EXCESS STD BLDA CALC-SCNC: -11.8 MEQ/L
CA-I BLDA-SCNC: 1.1 MMOL/L (ref 1.12–1.3)
HCO3 BLDA-SCNC: 15.1 MEQ/L (ref 22–26)
HCT VFR BLDA CALC: 34.4 % (ref 36–47)
HGB BLDA-MCNC: 11.3 G/DL (ref 12–16)
PCO2 BLDA: 37.8 MMHG (ref 35–45)
PH BLDA: 7.22 [PH] (ref 7.35–7.45)
PO2 BLDA: 97.3 MMHG (ref 80–100)
SPECIMEN DRAWN FROM PATIENT: (no result)